# Patient Record
Sex: MALE | URBAN - METROPOLITAN AREA
[De-identification: names, ages, dates, MRNs, and addresses within clinical notes are randomized per-mention and may not be internally consistent; named-entity substitution may affect disease eponyms.]

---

## 2019-04-09 ENCOUNTER — OFFICE VISIT (OUTPATIENT)
Dept: URBAN - METROPOLITAN AREA CLINIC 17 | Facility: CLINIC | Age: 34
End: 2019-04-09
Payer: COMMERCIAL

## 2019-04-09 DIAGNOSIS — H11.153 PINGUECULA, BILATERAL: ICD-10-CM

## 2019-04-09 DIAGNOSIS — H53.50 COLOR VISION DEFICIENCY: Primary | ICD-10-CM

## 2019-04-09 DIAGNOSIS — H17.89 OTHER CORNEAL SCAR: ICD-10-CM

## 2019-04-09 PROCEDURE — 99204 OFFICE O/P NEW MOD 45 MIN: CPT | Performed by: OPTOMETRIST

## 2019-04-09 PROCEDURE — 92283 EXTND COLOR VISION XM: CPT | Performed by: OPTOMETRIST

## 2019-04-09 ASSESSMENT — KERATOMETRY
OS: 41.00
OD: 41.38

## 2019-04-09 ASSESSMENT — INTRAOCULAR PRESSURE
OS: 18
OD: 18

## 2019-04-09 NOTE — IMPRESSION/PLAN
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 OU. [place] Plan: Discussed diagnosis in detail with patient. No treatment is required at this time. Reassured patient of current condition and treatment. Will continue to observe condition and or symptoms.

## 2019-04-09 NOTE — IMPRESSION/PLAN
Impression: Other corneal scar: H17.89. Plan: No treatment is required at this time. Will continue to observe condition and or symptoms.